# Patient Record
Sex: FEMALE | Race: WHITE | NOT HISPANIC OR LATINO | Employment: UNEMPLOYED | URBAN - METROPOLITAN AREA
[De-identification: names, ages, dates, MRNs, and addresses within clinical notes are randomized per-mention and may not be internally consistent; named-entity substitution may affect disease eponyms.]

---

## 2017-02-04 ENCOUNTER — HOSPITAL ENCOUNTER (EMERGENCY)
Facility: HOSPITAL | Age: 37
Discharge: HOME | End: 2017-02-05
Payer: COMMERCIAL

## 2017-02-04 VITALS
SYSTOLIC BLOOD PRESSURE: 125 MMHG | HEART RATE: 90 BPM | WEIGHT: 160 LBS | BODY MASS INDEX: 25.11 KG/M2 | HEIGHT: 67 IN | OXYGEN SATURATION: 100 % | DIASTOLIC BLOOD PRESSURE: 88 MMHG | TEMPERATURE: 97.6 F | RESPIRATION RATE: 22 BRPM

## 2017-02-04 DIAGNOSIS — J34.89 NASAL DISCHARGE: ICD-10-CM

## 2017-02-04 DIAGNOSIS — K13.70 UNSPECIFIED LESIONS OF ORAL MUCOSA: Primary | ICD-10-CM

## 2017-02-04 RX ORDER — KETOCONAZOLE 200 MG/1
200 TABLET ORAL DAILY
COMMUNITY
End: 2017-07-15 | Stop reason: ALTCHOICE

## 2017-02-04 RX ORDER — OXYCODONE HYDROCHLORIDE 30 MG/1
50 TABLET ORAL 3 TIMES DAILY
COMMUNITY

## 2017-02-04 RX ORDER — LEVOTHYROXINE SODIUM 0.15 MG/1
150 TABLET ORAL DAILY
COMMUNITY

## 2017-02-05 PROCEDURE — 87205 SMEAR GRAM STAIN: CPT | Performed by: PHYSICIAN ASSISTANT

## 2017-02-05 PROCEDURE — 99283 EMERGENCY DEPT VISIT LOW MDM: CPT

## 2017-02-05 PROCEDURE — 87070 CULTURE OTHR SPECIMN AEROBIC: CPT | Performed by: PHYSICIAN ASSISTANT

## 2017-02-05 RX ORDER — PREDNISONE 10 MG/1
TABLET ORAL
Qty: 38 TABLET | Refills: 0 | Status: SHIPPED | OUTPATIENT
Start: 2017-02-05 | End: 2017-07-15 | Stop reason: ALTCHOICE

## 2017-02-08 LAB
BACTERIA WND AEROBE CULT: NO GROWTH
GRAM STN SPEC: NORMAL

## 2017-07-15 ENCOUNTER — APPOINTMENT (EMERGENCY)
Dept: RADIOLOGY | Facility: HOSPITAL | Age: 37
End: 2017-07-15
Payer: COMMERCIAL

## 2017-07-15 ENCOUNTER — HOSPITAL ENCOUNTER (EMERGENCY)
Facility: HOSPITAL | Age: 37
Discharge: HOME | End: 2017-07-15
Attending: EMERGENCY MEDICINE | Admitting: EMERGENCY MEDICINE
Payer: COMMERCIAL

## 2017-07-15 VITALS
SYSTOLIC BLOOD PRESSURE: 113 MMHG | HEIGHT: 67 IN | BODY MASS INDEX: 25.11 KG/M2 | RESPIRATION RATE: 18 BRPM | TEMPERATURE: 98.3 F | OXYGEN SATURATION: 98 % | DIASTOLIC BLOOD PRESSURE: 68 MMHG | WEIGHT: 160 LBS | HEART RATE: 81 BPM

## 2017-07-15 DIAGNOSIS — S52.611A FRACTURE OF RIGHT ULNAR STYLOID: Primary | ICD-10-CM

## 2017-07-15 DIAGNOSIS — M79.605 LOWER EXTREMITY PAIN, LEFT: ICD-10-CM

## 2017-07-15 DIAGNOSIS — S62.339D: ICD-10-CM

## 2017-07-15 LAB — DEPRECATED D DIMER PPP: 329 NG/ML (FEU) (ref 0–424)

## 2017-07-15 PROCEDURE — 36415 COLL VENOUS BLD VENIPUNCTURE: CPT | Performed by: EMERGENCY MEDICINE

## 2017-07-15 PROCEDURE — 99284 EMERGENCY DEPT VISIT MOD MDM: CPT

## 2017-07-15 PROCEDURE — 73590 X-RAY EXAM OF LOWER LEG: CPT

## 2017-07-15 PROCEDURE — 85379 FIBRIN DEGRADATION QUANT: CPT | Performed by: EMERGENCY MEDICINE

## 2017-07-15 PROCEDURE — 73090 X-RAY EXAM OF FOREARM: CPT

## 2017-07-15 PROCEDURE — 73560 X-RAY EXAM OF KNEE 1 OR 2: CPT

## 2017-07-15 PROCEDURE — 73130 X-RAY EXAM OF HAND: CPT

## 2017-07-15 PROCEDURE — 73600 X-RAY EXAM OF ANKLE: CPT

## 2017-07-15 PROCEDURE — 73110 X-RAY EXAM OF WRIST: CPT

## 2017-07-15 PROCEDURE — 96372 THER/PROPH/DIAG INJ SC/IM: CPT

## 2017-07-15 RX ORDER — IBUPROFEN 800 MG/1
800 TABLET ORAL 2 TIMES DAILY
Qty: 20 TABLET | Refills: 0 | Status: SHIPPED | OUTPATIENT
Start: 2017-07-15 | End: 2017-07-25

## 2017-07-15 RX ORDER — KETOROLAC TROMETHAMINE 30 MG/ML
60 INJECTION, SOLUTION INTRAMUSCULAR; INTRAVENOUS ONCE
Status: COMPLETED | OUTPATIENT
Start: 2017-07-15 | End: 2017-07-15

## 2017-07-15 RX ADMIN — KETOROLAC TROMETHAMINE 60 MG: 30 INJECTION, SOLUTION INTRAMUSCULAR at 18:41

## 2018-06-05 ENCOUNTER — HOSPITAL ENCOUNTER (EMERGENCY)
Facility: HOSPITAL | Age: 38
Discharge: HOME | End: 2018-06-05
Attending: EMERGENCY MEDICINE | Admitting: EMERGENCY MEDICINE
Payer: COMMERCIAL

## 2018-06-05 VITALS
OXYGEN SATURATION: 97 % | DIASTOLIC BLOOD PRESSURE: 94 MMHG | HEIGHT: 67 IN | WEIGHT: 180 LBS | TEMPERATURE: 97.7 F | BODY MASS INDEX: 28.25 KG/M2 | HEART RATE: 106 BPM | SYSTOLIC BLOOD PRESSURE: 138 MMHG | RESPIRATION RATE: 20 BRPM

## 2018-06-05 DIAGNOSIS — F45.8 DELUSION OF PREGNANCY: Primary | ICD-10-CM

## 2018-06-05 PROCEDURE — 99283 EMERGENCY DEPT VISIT LOW MDM: CPT

## 2018-06-05 NOTE — DISCHARGE INSTRUCTIONS
You were seen today because of your concern that you have two pregnancies within your abdomen that you can feel moving  Based on our physical examination, the ultrasound that we performed, and review of results from your CT today, we do not see any evidence of pregnancy, especially not large enough to cause motion that you can feel  At this point, further testing is not expected to benefit you in any way and would only expose you to risk of harm  If you develop worsening or concerning symptoms, please feel free to return at any time

## 2018-06-05 NOTE — ED NOTES
Pt belongings are as follows: Tongue Ring  Earrings x3  Ring  Cell Phone  Cell phone   Lighter  Cigarettes  Hair extension   Boots  Belt  Pants  Sweatshirt  Socks  Stockings x2  Bra x3    Pt belongings are currently sitting in room next to sink        César Mcfadden  06/05/18 6804

## 2018-06-05 NOTE — ED NOTES
Pt provided discharge paperwork at this time  Pt verbalized understanding of discharge at this time  Pt alert/oriented  Vitals stable        Andrzej Harp RN  06/05/18 4547

## 2018-06-05 NOTE — ED NOTES
Dr Deo Odom asked Ying Drew to meet with pt, however pt is refusing to meet with CW, stating that her issue for being in the ED is that she is carrying a twin pregnancy in her liver and ovary and believes she will die as she has no uterus  CW informed Dr Deo Odom of this who stated he will D/C pt

## 2018-06-05 NOTE — ED PROVIDER NOTES
Pt Name: Nilesh Hanson  MRN: [id number]  [place] 1980  Age/Sex: 45 y o  female  Date of evaluation: 6/5/2018  PCP: Dahlia Gentile MD    [address] [place] [address]    Chief Complaint   Patient presents with    Psychiatric Evaluation     brought in by EMS, per EMS pt was seen at [facility] today for abd pain pt was told she had cysts, pt states she is pregnant with twins one on the liver and one on the bladder, pt had hysterectomy 3 yrs ago, significant psych hx         HPI    Nita Quintana presents to the Emergency Department complaining of 2 pregnancies within her abdomen  She states that she became pregnant with 1 baby on her liver and 1 in her left lower quadrant several months ago, and she has been feeling motion for both babies in both spots  She notes that she was discharged from [facility] ER after a CT scan as well as laboratory work and ultrasound  She states that they told her that she did not have babies but that she knows they are there  She denies any pain, fever, shortness of breath, trauma, other symptoms  HPI      Past Medical and Surgical History    Past Medical History:   Diagnosis Date    Disease of thyroid gland     Hyperlipidemia        Past Surgical History:   Procedure Laterality Date    BACK SURGERY      CHOLECYSTECTOMY      HYSTERECTOMY         History reviewed  No pertinent family history  Social History   Substance Use Topics    Smoking status: Current Every Day Smoker     Packs/day: 1 00     Types: Cigarettes    Smokeless tobacco: Never Used    Alcohol use No              Allergies    No Known Allergies    Home Medications    Prior to Admission medications    Medication Sig Start Date End Date Taking?  Authorizing Provider   ibuprofen (MOTRIN) 800 mg tablet Take 1 tablet by mouth 2 (two) times a day for 10 days 7/15/17 7/25/17  Cammienancy Pintley,    levothyroxine 150 mcg tablet Take 150 mcg by mouth daily    Historical Provider, MD   mupirocin (BACTROBAN) 2 % ointment Apply topically 3 (three) times a day    Historical Provider, MD   oxyCODONE (ROXICODONE) 30 MG immediate release tablet Take 50 mg by mouth 3 (three) times a day    Historical Provider, MD           Review of Systems    Review of Systems   Constitutional: Negative for activity change, chills and fever  HENT: Negative for drooling and facial swelling  Eyes: Negative for pain, discharge and visual disturbance  Respiratory: Negative for apnea, cough, chest tightness, shortness of breath and wheezing  Cardiovascular: Negative for chest pain and leg swelling  Gastrointestinal: Negative for abdominal pain, constipation, diarrhea, nausea and vomiting  Genitourinary: Negative for difficulty urinating, dysuria and urgency  Musculoskeletal: Negative for arthralgias, back pain and gait problem  Skin: Negative for color change and rash  Neurological: Negative for dizziness, speech difficulty, weakness and headaches  Psychiatric/Behavioral: Positive for agitation  All other systems reviewed and negative  Physical Exam      ED Triage Vitals   Temperature Pulse Respirations Blood Pressure SpO2   06/05/18 1829 06/05/18 1812 06/05/18 1812 06/05/18 1812 06/05/18 1812   97 7 °F (36 5 °C) (!) 106 20 138/94 97 %      Temp Source Heart Rate Source Patient Position - Orthostatic VS BP Location FiO2 (%)   06/05/18 1829 06/05/18 1812 06/05/18 1812 06/05/18 1812 --   Oral Monitor Sitting Right arm       Pain Score       --                      Physical Exam   Constitutional: She is oriented to person, place, and time  She appears well-developed and well-nourished  HENT:   Head: Normocephalic and atraumatic  Eyes: Conjunctivae and EOM are normal  Pupils are equal, round, and reactive to light  Neck: Normal range of motion  Neck supple  Cardiovascular: Normal rate, regular rhythm, normal heart sounds and intact distal pulses  Pulmonary/Chest: Effort normal and breath sounds normal  No respiratory distress   She has no wheezes  She has no rales  Abdominal: Soft  She exhibits no distension and no mass  There is no tenderness  There is no rebound and no guarding  No firmness were contractions felt during palpation of the abdomen despite patient's insistence that she was having contractions at that moment   Musculoskeletal: Normal range of motion  She exhibits no edema or deformity  Neurological: She is alert and oriented to person, place, and time  Skin: Skin is warm and dry  No rash noted  No erythema  Psychiatric: She has a normal mood and affect  Her behavior is normal  Judgment and thought content normal               Assessment and Plan    Katelyn Washington is a 45 y o  female who presents with concern for twin ectopic pregnancies as above  Physical examination unremarkable and not consistent with her belief that their baby is kicking within her stomach  Differential diagnosis (not completely inclusive) includes persistent delusion, intoxication, ectopic pregnancy    Plan will be to perform diagnostic testing and treat symptomatically  MDM    Diagnostic Results      Labs:    Results for orders placed or performed during the hospital encounter of 07/15/17   D-Dimer   Result Value Ref Range    D-Dimer, Quant 329 0 - 424 ng/ml (FEU)       All labs reviewed and utilized in the medical decision making process    Radiology:    No orders to display       All radiology studies independently viewed by me and interpreted by the radiologist     Procedure    Procedures    CritCare Time      ED Course of Care and Re-Assessments      Informal bedside ultrasound performed at sites of felt motion  In left lower quadrant, stool visualized moving through colon at time where patient felt that she was feeling movement  Entire liver visualized with good windows and no pregnancy seen there, patient viewed same but stated that she saw the baby on the screen        Discussed case with staff at [facility], who also seen patient today and performed extensive lab workup and CT scan  Per nursing staff who reviewed the records, laboratory results were reassuring and the CT scan showed no acute disease, in contrast to the patient's account of the CT scan showed 2 babies within her abdomen  Confirmed no evidence of suicidal or homicidal behavior at that institution either, involuntary hold not placed at that [address]  Discussed case with crisis, patient interviewed by same, no evidence of imminent harm to self or others or other indication for involuntary hold apparent at this time  Medications - No data to display        FINAL IMPRESSION    Final diagnoses:   Delusion of pregnancy         DISPOSITION/PLAN    75-year-old female with history and symptoms as above  Vital signs on examination reassuring, based on a history of bipolar disorder and hysterectomy, as well as account from extensive workup at other ER immediately prior to this visit, most consistent with delusion of pregnancy or psychosis  No indication for involuntary hold at this time and further testing would only expose the patient to increased risk without benefit, deferred at this time  D/c w/ srp, HDS and ambulating without difficulty at that time  Time reflects when diagnosis was documented in both MDM as applicable and the Disposition within this note     Time User Action Codes Description Comment    6/5/2018  6:56 PM Wilda Asencio Add [F45 8] Delusion of pregnancy       ED Disposition     ED Disposition Condition Comment    Discharge  Mendel Courser discharge to home/self care  Condition at discharge: Good        Follow-up Information     Follow up With Specialties Details Why Bridgett Lundborg, MD Family Medicine Call in 1 day To discuss this visit and further care   [address]  [phone]              PATIENT REFERRED TO:    Campos Krishnan, [facility] [address] [place] [address] 3  [phone]    Call in 1 day  To discuss this visit and further care  DISCHARGE MEDICATIONS:    Discharge Medication List as of 6/5/2018  7:15 PM      CONTINUE these medications which have NOT CHANGED    Details   ibuprofen (MOTRIN) 800 mg tablet Take 1 tablet by mouth 2 (two) times a day for 10 days, Starting Sat 7/15/2017, Until Tue 7/25/2017, Print      levothyroxine 150 mcg tablet Take 150 mcg by mouth daily, Until Discontinued, Historical Med      mupirocin (BACTROBAN) 2 % ointment Apply topically 3 (three) times a day, Until Discontinued, Historical Med      oxyCODONE (ROXICODONE) 30 MG immediate release tablet Take 50 mg by mouth 3 (three) times a day, Until Discontinued, Historical Med             No discharge procedures on file           MD Kim Ambrose MD  06/05/18 6048

## 2018-06-05 NOTE — ED NOTES
Pt seen and evaluated by physician, physician performed bedside US        Chantel Conrad RN  06/05/18 7342

## 2020-01-24 ENCOUNTER — HOSPITAL ENCOUNTER (EMERGENCY)
Facility: HOSPITAL | Age: 40
Discharge: HOME | End: 2020-01-24
Admitting: EMERGENCY MEDICINE
Payer: COMMERCIAL

## 2020-01-24 ENCOUNTER — APPOINTMENT (EMERGENCY)
Dept: RADIOLOGY | Facility: HOSPITAL | Age: 40
End: 2020-01-24
Payer: COMMERCIAL

## 2020-01-24 VITALS
RESPIRATION RATE: 18 BRPM | SYSTOLIC BLOOD PRESSURE: 120 MMHG | WEIGHT: 188.05 LBS | TEMPERATURE: 98.4 F | HEIGHT: 67 IN | OXYGEN SATURATION: 97 % | BODY MASS INDEX: 29.52 KG/M2 | DIASTOLIC BLOOD PRESSURE: 86 MMHG | HEART RATE: 89 BPM

## 2020-01-24 DIAGNOSIS — M25.562 LEFT KNEE PAIN: Primary | ICD-10-CM

## 2020-01-24 LAB — D DIMER PPP FEU-MCNC: <0.27 UG/ML FEU

## 2020-01-24 PROCEDURE — 99284 EMERGENCY DEPT VISIT MOD MDM: CPT | Performed by: PHYSICIAN ASSISTANT

## 2020-01-24 PROCEDURE — 99284 EMERGENCY DEPT VISIT MOD MDM: CPT

## 2020-01-24 PROCEDURE — 73564 X-RAY EXAM KNEE 4 OR MORE: CPT

## 2020-01-24 PROCEDURE — 36415 COLL VENOUS BLD VENIPUNCTURE: CPT | Performed by: PHYSICIAN ASSISTANT

## 2020-01-24 PROCEDURE — 86617 LYME DISEASE ANTIBODY: CPT | Performed by: PHYSICIAN ASSISTANT

## 2020-01-24 PROCEDURE — 85379 FIBRIN DEGRADATION QUANT: CPT | Performed by: PHYSICIAN ASSISTANT

## 2020-01-24 PROCEDURE — 86618 LYME DISEASE ANTIBODY: CPT | Performed by: PHYSICIAN ASSISTANT

## 2020-01-24 NOTE — ED PROVIDER NOTES
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 Taking?   ibuprofen (MOTRIN) 800 mg tablet   No No   Sig: Take 1 tablet by mouth 2 (two) times a day for 10 days   levothyroxine 150 mcg tablet   Yes No   Sig: Take 150 mcg by mouth daily   mupirocin (BACTROBAN) 2 % ointment   Yes No   Sig: Apply topically 3 (three) times a day   oxyCODONE (ROXICODONE) 30 MG immediate release tablet   Yes No   Sig: Take 50 mg by mouth 3 (three) times a day      Facility-Administered Medications: None       Past Medical History:   Diagnosis Date    Disease of thyroid gland     Hyperlipidemia        Past Surgical History:   Procedure Laterality Date    BACK SURGERY      CHOLECYSTECTOMY      HYSTERECTOMY         History reviewed  No pertinent family history  I have reviewed and agree with the history as documented  Social History     Tobacco Use    Smoking status: Current Every Day Smoker     Packs/day: 1 00     Types: Cigarettes    Smokeless tobacco: Never Used   Substance Use Topics    Alcohol use: No    Drug use: No        Review of Systems   Constitutional: Negative for activity change and fever     HENT: Negative for congestion, rhinorrhea and sore throat  Eyes: Negative for visual disturbance  Respiratory: Negative for cough and shortness of breath  Cardiovascular: Negative for chest pain  Gastrointestinal: Negative for abdominal pain, diarrhea, nausea and vomiting  Genitourinary: Negative for vaginal discharge  Musculoskeletal: Positive for joint swelling  Negative for neck pain  Skin: Negative for rash  Allergic/Immunologic: Negative for immunocompromised state  Neurological: Negative for syncope  Psychiatric/Behavioral: The patient is nervous/anxious  All other systems reviewed and are negative  Physical Exam  Physical Exam   Constitutional: She is oriented to person, place, and time  She appears well-developed and well-nourished  No distress  HENT:   Head: Normocephalic and atraumatic  Mouth/Throat: Oropharynx is clear and moist    Eyes: Pupils are equal, round, and reactive to light  Conjunctivae and EOM are normal    Neck: Normal range of motion  Neck supple  Cardiovascular: Normal rate, regular rhythm, normal heart sounds and intact distal pulses  No murmur heard  Pulmonary/Chest: Effort normal and breath sounds normal  No respiratory distress  Abdominal: Soft  Bowel sounds are normal  There is no tenderness  Musculoskeletal: Normal range of motion  Neurological: She is alert and oriented to person, place, and time  No cranial nerve deficit  Skin: Skin is warm and dry  She is not diaphoretic  Psychiatric: She has a normal mood and affect  Her behavior is normal    Nursing note and vitals reviewed        Vital Signs  ED Triage Vitals [01/24/20 1317]   Temperature Pulse Respirations Blood Pressure SpO2   98 4 °F (36 9 °C) 89 18 120/86 97 %      Temp Source Heart Rate Source Patient Position - Orthostatic VS BP Location FiO2 (%)   Oral Monitor Sitting Left arm --      Pain Score       No Pain           Vitals:    01/24/20 1317   BP: 120/86   Pulse: 89   Patient Position - Orthostatic VS: Sitting         Visual Acuity      ED Medications  Medications - No data to display    Diagnostic Studies  Results Reviewed     Procedure Component Value Units Date/Time    D-dimer, quantitative [[id number]]  (Normal) Collected:  01/24/20 1438    Lab Status:  Final result Specimen:  Blood from Arm, Right Updated:  01/24/20 1515     D-Dimer, Quant <0 27 ug/ml FEU     Lyme Antibody Profile with reflex to [facility] [[id number]] Collected:  01/24/20 1438    Lab Status: In process Specimen:  Blood from Arm, Right Updated:  01/24/20 1442                 XR knee 4+ views left injury   ED Interpretation by Neela Solitario PA-C (01/24 1437)   No fx/dislocations  Normal knee  No joint effusion  No arthritis  Final Result by Milan Olson MD (01/24 3998)      No acute osseous abnormality  Workstation performed: [id number]                    Procedures  Procedures         ED Course           refused pain meds for discharge and in ed      reviewed xray and ddimer results with patient  Will f/u for mri with ortho for potential sprain of knee  No septic joint  knee immobilizer applied by ED nurse  Pt refused crutches  MDM      Disposition  Final diagnoses:   Left knee pain     Time reflects when diagnosis was documented in both MDM as applicable and the Disposition within this note     Time User Action Codes Description Comment    1/24/2020  3:42 PM Winston Phoenix Add [D33 683] Left knee pain       ED Disposition     ED Disposition Condition Date/Time Comment    Discharge Stable Fri Jan 24, 2020  3:42 PM Angela Martinez discharge to home/self care              Follow-up Information     Follow up With Specialties Details Why Contact Info Additional Information    Angie Maher MD Obstetrics and Gynecology, Obstetrics, Gynecology   [address] [facility] [phone] [address] [facility] Emergency Department Emergency Medicine  If symptoms worsen [address] [facility] [address] [facility] [address] [phone] [place] ED, [address] [place], [place], [address] Orthopedic Surgery Schedule an appointment as soon as possible for a visit in 1 week  [facility] [address]  [phone]             Discharge Medication List as of 1/24/2020  3:44 PM      CONTINUE these medications which have NOT CHANGED    Details   ibuprofen (MOTRIN) 800 mg tablet Take 1 tablet by mouth 2 (two) times a day for 10 days, Starting Sat 7/15/2017, Until Tue 7/25/2017, Print      levothyroxine 150 mcg tablet Take 150 mcg by mouth daily, Until Discontinued, Historical Med      mupirocin (BACTROBAN) 2 % ointment Apply topically 3 (three) times a day, Until Discontinued, Historical Med      oxyCODONE (ROXICODONE) 30 MG immediate release tablet Take 50 mg by mouth 3 (three) times a day, Until Discontinued, Historical Med           No discharge procedures on file      ED Provider  Electronically Signed by           Long Shaw PA-C  01/24/20 6635

## 2020-01-27 LAB
B BURGDOR IGG PATRN SER IB-IMP: NEGATIVE
B BURGDOR IGG+IGM SER-ACNC: 1.45 ISR (ref 0–0.9)
B BURGDOR IGM PATRN SER IB-IMP: NEGATIVE
B BURGDOR18KD IGG SER QL IB: PRESENT
B BURGDOR23KD IGG SER QL IB: PRESENT
B BURGDOR23KD IGM SER QL IB: PRESENT
B BURGDOR28KD IGG SER QL IB: ABNORMAL
B BURGDOR30KD IGG SER QL IB: ABNORMAL
B BURGDOR39KD IGG SER QL IB: ABNORMAL
B BURGDOR39KD IGM SER QL IB: ABNORMAL
B BURGDOR41KD IGG SER QL IB: PRESENT
B BURGDOR41KD IGM SER QL IB: ABNORMAL
B BURGDOR45KD IGG SER QL IB: ABNORMAL
B BURGDOR58KD IGG SER QL IB: ABNORMAL
B BURGDOR66KD IGG SER QL IB: ABNORMAL
B BURGDOR93KD IGG SER QL IB: ABNORMAL